# Patient Record
Sex: FEMALE | Race: WHITE | NOT HISPANIC OR LATINO
[De-identification: names, ages, dates, MRNs, and addresses within clinical notes are randomized per-mention and may not be internally consistent; named-entity substitution may affect disease eponyms.]

---

## 2017-10-05 ENCOUNTER — APPOINTMENT (OUTPATIENT)
Dept: PULMONOLOGY | Facility: CLINIC | Age: 57
End: 2017-10-05
Payer: COMMERCIAL

## 2017-10-05 VITALS — WEIGHT: 164 LBS | HEIGHT: 67 IN | RESPIRATION RATE: 12 BRPM | BODY MASS INDEX: 25.74 KG/M2

## 2017-10-05 VITALS — SYSTOLIC BLOOD PRESSURE: 124 MMHG | OXYGEN SATURATION: 99 % | DIASTOLIC BLOOD PRESSURE: 80 MMHG | HEART RATE: 75 BPM

## 2017-10-05 PROCEDURE — 99214 OFFICE O/P EST MOD 30 MIN: CPT | Mod: 25

## 2017-10-05 PROCEDURE — 94010 BREATHING CAPACITY TEST: CPT

## 2017-10-05 RX ORDER — NEOMYCIN SULFATE, POLYMYXIN B SULFATE AND BACITRACIN ZINC 3.5; 10000; 4 MG/G; [USP'U]/G; [USP'U]/G
5-400-10000 OINTMENT OPHTHALMIC
Qty: 35 | Refills: 0 | Status: DISCONTINUED | COMMUNITY
Start: 2017-06-12

## 2018-06-26 ENCOUNTER — APPOINTMENT (OUTPATIENT)
Dept: NEUROSURGERY | Facility: CLINIC | Age: 58
End: 2018-06-26
Payer: COMMERCIAL

## 2018-06-26 VITALS — BODY MASS INDEX: 24.25 KG/M2 | WEIGHT: 160 LBS | HEIGHT: 68 IN

## 2018-06-26 DIAGNOSIS — M54.2 CERVICALGIA: ICD-10-CM

## 2018-06-26 DIAGNOSIS — M54.9 DORSALGIA, UNSPECIFIED: ICD-10-CM

## 2018-06-26 PROCEDURE — 99204 OFFICE O/P NEW MOD 45 MIN: CPT

## 2018-08-03 ENCOUNTER — APPOINTMENT (OUTPATIENT)
Dept: PULMONOLOGY | Facility: CLINIC | Age: 58
End: 2018-08-03
Payer: COMMERCIAL

## 2018-08-03 VITALS
BODY MASS INDEX: 24.55 KG/M2 | HEART RATE: 74 BPM | OXYGEN SATURATION: 97 % | DIASTOLIC BLOOD PRESSURE: 80 MMHG | SYSTOLIC BLOOD PRESSURE: 130 MMHG | HEIGHT: 68 IN | WEIGHT: 162 LBS

## 2018-08-03 DIAGNOSIS — Z85.118 PERSONAL HISTORY OF OTHER MALIGNANT NEOPLASM OF BRONCHUS AND LUNG: ICD-10-CM

## 2018-08-03 PROCEDURE — 94729 DIFFUSING CAPACITY: CPT

## 2018-08-03 PROCEDURE — 85018 HEMOGLOBIN: CPT | Mod: QW

## 2018-08-03 PROCEDURE — 99214 OFFICE O/P EST MOD 30 MIN: CPT | Mod: 25

## 2018-08-03 PROCEDURE — 94010 BREATHING CAPACITY TEST: CPT

## 2018-08-03 PROCEDURE — 94727 GAS DIL/WSHOT DETER LNG VOL: CPT

## 2018-08-03 RX ORDER — OMEPRAZOLE 20 MG/1
20 TABLET, DELAYED RELEASE ORAL
Refills: 0 | Status: ACTIVE | COMMUNITY

## 2018-10-03 DIAGNOSIS — R20.2 PARESTHESIA OF SKIN: ICD-10-CM

## 2018-10-09 PROBLEM — R20.2 PARESTHESIA OF RIGHT ARM: Status: ACTIVE | Noted: 2018-10-09

## 2018-10-24 ENCOUNTER — RESULT REVIEW (OUTPATIENT)
Age: 58
End: 2018-10-24

## 2018-11-13 ENCOUNTER — RESULT CHARGE (OUTPATIENT)
Age: 58
End: 2018-11-13

## 2018-11-13 ENCOUNTER — RESULT REVIEW (OUTPATIENT)
Age: 58
End: 2018-11-13

## 2020-04-07 ENCOUNTER — APPOINTMENT (OUTPATIENT)
Dept: PULMONOLOGY | Facility: CLINIC | Age: 60
End: 2020-04-07

## 2020-07-24 ENCOUNTER — APPOINTMENT (OUTPATIENT)
Dept: PULMONOLOGY | Facility: CLINIC | Age: 60
End: 2020-07-24
Payer: COMMERCIAL

## 2020-07-24 VITALS
HEIGHT: 68 IN | WEIGHT: 164 LBS | SYSTOLIC BLOOD PRESSURE: 144 MMHG | OXYGEN SATURATION: 98 % | HEART RATE: 72 BPM | RESPIRATION RATE: 14 BRPM | BODY MASS INDEX: 24.86 KG/M2 | DIASTOLIC BLOOD PRESSURE: 80 MMHG

## 2020-07-24 DIAGNOSIS — J44.9 CHRONIC OBSTRUCTIVE PULMONARY DISEASE, UNSPECIFIED: ICD-10-CM

## 2020-07-24 DIAGNOSIS — R93.89 ABNORMAL FINDINGS ON DIAGNOSTIC IMAGING OF OTHER SPECIFIED BODY STRUCTURES: ICD-10-CM

## 2020-07-24 DIAGNOSIS — R06.02 SHORTNESS OF BREATH: ICD-10-CM

## 2020-07-24 DIAGNOSIS — R91.8 OTHER NONSPECIFIC ABNORMAL FINDING OF LUNG FIELD: ICD-10-CM

## 2020-07-24 PROCEDURE — 99214 OFFICE O/P EST MOD 30 MIN: CPT

## 2020-07-24 NOTE — PHYSICAL EXAM
[General Appearance - Well Developed] : well developed [General Appearance - In No Acute Distress] : no acute distress [Normal Appearance] : normal appearance [Normal Conjunctiva] : the conjunctiva exhibited no abnormalities [Normal Oropharynx] : normal oropharynx [Neck Appearance] : the appearance of the neck was normal [Heart Rate And Rhythm] : heart rate and rhythm were normal [Heart Sounds] : normal S1 and S2 [] : no respiratory distress [Edema] : no peripheral edema present [Murmurs] : no murmurs present [Auscultation Breath Sounds / Voice Sounds] : lungs were clear to auscultation bilaterally [Exaggerated Use Of Accessory Muscles For Inspiration] : no accessory muscle use [Respiration, Rhythm And Depth] : normal respiratory rhythm and effort [Abdomen Tenderness] : non-tender [Abdomen Soft] : soft [Abnormal Walk] : normal gait [Nail Clubbing] : no clubbing of the fingernails [Cyanosis, Localized] : no localized cyanosis [Oriented To Time, Place, And Person] : oriented to person, place, and time [FreeTextEntry1] : No abnormalities

## 2020-07-24 NOTE — CONSULT LETTER
[Dear  ___] : Dear  [unfilled], [Please see my note below.] : Please see my note below. [Consult Letter:] : I had the pleasure of evaluating your patient, [unfilled]. [Consult Closing:] : Thank you very much for allowing me to participate in the care of this patient.  If you have any questions, please do not hesitate to contact me. [DrNida  ___] : Dr. PRUETT [Neville Cheung MD] : Neville Cheung MD [Sincerely,] : Sincerely, [FreeTextEntry3] : Neville Cheung MD, FCCP, D. ABSM\par Pulmonary and Sleep Medicine\par [facility] [place]

## 2020-07-24 NOTE — HISTORY OF PRESENT ILLNESS
[On ___] : performed on [unfilled] [Patient] : the patient [Indication ___] : for an indication of [unfilled] [None] : no new symptoms reported [FreeTextEntry1] : The patient was seen 3/2015 after she went for a chest CT due to SOB and chest discomfort after shoveling snow. The CT revealed b/l pulm nodules and she subsequently had a + PET CT which revealed b/l increased uptake. She then underwent b/l UL lobectomy over a 3 month period for NSCLC in March and June 2015.. She has been followed by oncology since that time and has been doing well. She currently denies any respiratory complaints.She is an ex-30 pk-yr smoker but quit 1/2015. She is at relative baseline. Pt completed chemoRx in 10/2015.\par \par Patient c/o SOBOE but is otherwise without associated respiratory complaints. [FreeTextEntry9] : Chest CT [FreeTextEntry8] : Stable changes

## 2020-07-24 NOTE — DISCUSSION/SUMMARY
[FreeTextEntry1] : \par #1. Diet and exercise to improve her exercise tolerance\par #2. Obstruction on PFTs but is relatively asymptomatic therefore does not want inhaler therapy\par #3. Will follow lung function periodically\par #4. F/u in 6-9 months with PFTs\par #5. Diet and exercise for weight loss\par #6. Oncology f/u\par #7. Reviewed risks of exposure and symptoms of Covid-19 virus, including how the virus is spread.\par \par Discussed the following risk-reducing strategies:\par -Wash hands with soap and water (proper technique reviewed) \par -Use alcohol based  when hand-washing is not an option\par -Maintain a social distance of at least 6 feet whenever possible\par -Avoid contact, especially hand shaking\par -Avoid touching eyes, nose, and mouth\par -Cover face/mouth when coughing or sneezing\par -Avoid close contact with sick people\par -Seek medical help if you develop a fever and/or respiratory symptoms (e.g. cough, chest pain, SOB)\par \par Patient's questions were answered and patient appears to understand these recommendations

## 2020-07-24 NOTE — REASON FOR VISIT
[Follow-Up] : a follow-up visit [Abnormal CT Scan] : abnormal CT Scan [Lung Cancer] : lung cancer [FreeTextEntry2] : SOBOE

## 2020-07-24 NOTE — REVIEW OF SYSTEMS
[Anxiety] : anxiety [Fever] : no fever [Chills] : no chills [Dry Eyes] : no dryness of the eyes [Nasal Congestion] : no nasal congestion [Eye Irritation] : no ~T irritation of the eyes [Sinus Problems] : no sinus problems [Postnasal Drip] : no postnasal drip [Epistaxis] : no nosebleeds [Sputum] : not coughing up ~M sputum [Cough] : no cough [Chest Tightness] : no chest tightness [Dyspnea] : no dyspnea [Pleuritic Pain] : no pleuritic pain [Hypertension] : no ~T hypertension [Wheezing] : no wheezing [Chest Discomfort] : no chest discomfort [Dysrhythmia] : no dysrhythmia [Edema] : ~T edema was not present [Murmurs] : no murmurs were heard [Palpitations] : no palpitations [Reflux] : no reflux [Itchy Eyes] : no itching of ~T the eyes [Hay Fever] : no hay fever [Constipation] : no constipation [Vomiting] : no vomiting [Nausea] : no nausea [Abdominal Pain] : no abdominal pain [Diarrhea] : no diarrhea [Fracture] : no fracture [Trauma] : no ~T physical trauma [Dysuria] : no dysuria [Headache] : no headache [Anemia] : no anemia [Syncope] : no fainting [Dizziness] : no dizziness [Paralysis] : no paralysis was seen [Numbness] : no numbness [Seizures] : no seizures [Depression] : no depression [Diabetes] : no diabetes mellitus [Thyroid Problem] : no thyroid problem [Snoring] : no snoring [Witnessed Apneas] : demonstrated no ~M apnea [Nonrestorative Sleep] : restorative sleep

## 2020-07-24 NOTE — PROCEDURE
[FreeTextEntry1] : PFTs performed previously revealed a normal FVC and FEV1 but with an obstructive pattern which was new. She did not have a significant BD response but did have resolution of the obstruction with nebs. Lung volumes were essentially normal and diffusion capacity was mildly reduced. Overall, her lung function was likely at her new baseline post-op.\par \par PFTs subsequently were unchanged

## 2021-06-16 ENCOUNTER — APPOINTMENT (OUTPATIENT)
Dept: UROGYNECOLOGY | Facility: CLINIC | Age: 61
End: 2021-06-16

## 2023-03-31 ENCOUNTER — OFFICE (OUTPATIENT)
Dept: URBAN - METROPOLITAN AREA CLINIC 113 | Facility: CLINIC | Age: 63
Setting detail: OPHTHALMOLOGY
End: 2023-03-31
Payer: COMMERCIAL

## 2023-03-31 DIAGNOSIS — H25.13: ICD-10-CM

## 2023-03-31 DIAGNOSIS — H40.013: ICD-10-CM

## 2023-03-31 DIAGNOSIS — H43.393: ICD-10-CM

## 2023-03-31 DIAGNOSIS — H04.123: ICD-10-CM

## 2023-03-31 DIAGNOSIS — H43.392: ICD-10-CM

## 2023-03-31 DIAGNOSIS — H43.391: ICD-10-CM

## 2023-03-31 PROCEDURE — 92014 COMPRE OPH EXAM EST PT 1/>: CPT | Performed by: OPHTHALMOLOGY

## 2023-03-31 PROCEDURE — 92250 FUNDUS PHOTOGRAPHY W/I&R: CPT | Performed by: OPHTHALMOLOGY

## 2023-03-31 ASSESSMENT — REFRACTION_CURRENTRX
OD_OVR_VA: 20/
OS_AXIS: 146
OS_VPRISM_DIRECTION: PROGS
OD_VPRISM_DIRECTION: PROGS
OD_SPHERE: +2.50
OD_AXIS: 144
OS_OVR_VA: 20/
OD_CYLINDER: -0.25
OD_ADD: +1.75
OS_ADD: +1.75
OS_SPHERE: +3.50
OS_CYLINDER: -0.50

## 2023-03-31 ASSESSMENT — VISUAL ACUITY
OD_BCVA: 20/25
OS_BCVA: 20/25

## 2023-03-31 ASSESSMENT — REFRACTION_MANIFEST
OS_ADD: +2.50
OD_CYLINDER: -0.50
OS_SPHERE: +0.25
OD_AXIS: 150
OS_CYLINDER: -0.50
OD_ADD: +2.50
OS_VA1: 20/30+
OS_AXIS: 045
OD_VA1: 20/30+
OD_SPHERE: +0.25

## 2023-03-31 ASSESSMENT — REFRACTION_AUTOREFRACTION
OD_CYLINDER: -0.75
OS_AXIS: 066
OD_SPHERE: -0.50
OD_AXIS: 125
OS_SPHERE: -1.25
OS_CYLINDER: -0.50

## 2023-03-31 ASSESSMENT — SPHEQUIV_DERIVED
OD_SPHEQUIV: 0
OS_SPHEQUIV: -1.5
OD_SPHEQUIV: -0.875
OS_SPHEQUIV: 0

## 2023-03-31 ASSESSMENT — CONFRONTATIONAL VISUAL FIELD TEST (CVF)
OS_FINDINGS: FULL
OD_FINDINGS: FULL

## 2023-03-31 ASSESSMENT — TONOMETRY
OS_IOP_MMHG: 11
OD_IOP_MMHG: 12

## 2023-04-28 ENCOUNTER — OFFICE (OUTPATIENT)
Dept: URBAN - METROPOLITAN AREA CLINIC 113 | Facility: CLINIC | Age: 63
Setting detail: OPHTHALMOLOGY
End: 2023-04-28
Payer: COMMERCIAL

## 2023-04-28 DIAGNOSIS — H43.392: ICD-10-CM

## 2023-04-28 DIAGNOSIS — H43.391: ICD-10-CM

## 2023-04-28 DIAGNOSIS — H04.123: ICD-10-CM

## 2023-04-28 DIAGNOSIS — H43.393: ICD-10-CM

## 2023-04-28 DIAGNOSIS — H40.013: ICD-10-CM

## 2023-04-28 DIAGNOSIS — H25.13: ICD-10-CM

## 2023-04-28 PROCEDURE — 92083 EXTENDED VISUAL FIELD XM: CPT | Performed by: OPHTHALMOLOGY

## 2023-04-28 PROCEDURE — 92134 CPTRZ OPH DX IMG PST SGM RTA: CPT | Performed by: OPHTHALMOLOGY

## 2023-04-28 PROCEDURE — 99213 OFFICE O/P EST LOW 20 MIN: CPT | Performed by: OPHTHALMOLOGY

## 2023-04-28 ASSESSMENT — REFRACTION_MANIFEST
OD_SPHERE: +0.25
OD_AXIS: 150
OS_ADD: +2.50
OD_CYLINDER: -0.50
OS_CYLINDER: -0.50
OD_VA1: 20/30+
OS_VA1: 20/30+
OD_ADD: +2.50
OS_AXIS: 045
OS_SPHERE: +0.25

## 2023-04-28 ASSESSMENT — REFRACTION_CURRENTRX
OD_OVR_VA: 20/
OS_VPRISM_DIRECTION: PROGS
OD_CYLINDER: -0.25
OD_AXIS: 144
OD_ADD: +1.75
OS_SPHERE: +3.50
OS_ADD: +1.75
OS_CYLINDER: -0.50
OD_SPHERE: +2.50
OS_AXIS: 146
OD_VPRISM_DIRECTION: PROGS
OS_OVR_VA: 20/

## 2023-04-28 ASSESSMENT — REFRACTION_AUTOREFRACTION
OD_AXIS: 135
OS_AXIS: 061
OD_CYLINDER: -1.00
OS_SPHERE: -1.25
OD_SPHERE: -0.50
OS_CYLINDER: -0.50

## 2023-04-28 ASSESSMENT — CONFRONTATIONAL VISUAL FIELD TEST (CVF)
OS_FINDINGS: FULL
OD_FINDINGS: FULL

## 2023-04-28 ASSESSMENT — KERATOMETRY
OD_K2POWER_DIOPTERS: 41.75
OS_K2POWER_DIOPTERS: 41.75
OD_K1POWER_DIOPTERS: 41.00
OD_AXISANGLE_DEGREES: 087
OS_AXISANGLE_DEGREES: 086
OS_K1POWER_DIOPTERS: 40.75

## 2023-04-28 ASSESSMENT — TONOMETRY
OD_IOP_MMHG: 13
OS_IOP_MMHG: 12

## 2023-04-28 ASSESSMENT — SPHEQUIV_DERIVED
OS_SPHEQUIV: -1.5
OS_SPHEQUIV: 0
OD_SPHEQUIV: 0
OD_SPHEQUIV: -1

## 2023-04-28 ASSESSMENT — VISUAL ACUITY
OS_BCVA: 20/25
OD_BCVA: 20/25

## 2023-04-28 ASSESSMENT — AXIALLENGTH_DERIVED
OS_AL: 24.4484
OS_AL: 25.0925
OD_AL: 24.3991
OD_AL: 24.8231

## 2023-05-26 ENCOUNTER — OFFICE (OUTPATIENT)
Dept: URBAN - METROPOLITAN AREA CLINIC 115 | Facility: CLINIC | Age: 63
Setting detail: OPHTHALMOLOGY
End: 2023-05-26
Payer: COMMERCIAL

## 2023-05-26 DIAGNOSIS — H25.12: ICD-10-CM

## 2023-05-26 DIAGNOSIS — H04.123: ICD-10-CM

## 2023-05-26 DIAGNOSIS — H25.13: ICD-10-CM

## 2023-05-26 DIAGNOSIS — H43.393: ICD-10-CM

## 2023-05-26 DIAGNOSIS — H40.013: ICD-10-CM

## 2023-05-26 PROBLEM — H25.11 CATARACT SENILE NUCLEAR SCLEROSIS; RIGHT EYE, LEFT EYE, BOTH EYES: Status: ACTIVE | Noted: 2023-05-26

## 2023-05-26 PROCEDURE — 92136 OPHTHALMIC BIOMETRY: CPT | Performed by: OPHTHALMOLOGY

## 2023-05-26 PROCEDURE — 99214 OFFICE O/P EST MOD 30 MIN: CPT | Performed by: OPHTHALMOLOGY

## 2023-05-26 ASSESSMENT — KERATOMETRY
OS_K1POWER_DIOPTERS: 40.75
OS_K1POWER_DIOPTERS: 40.75
OS_CYLAXISANGLE_DEGREES: 086
OD_K1POWER_DIOPTERS: 41.00
OS_AXISANGLE_DEGREES: 086
OD_K2POWER_DIOPTERS: 41.75
OD_AXISANGLE_DEGREES: 177
OS_AXISANGLE_DEGREES: 176
OD_CYLPOWER_DEGREES: 0.75
OD_AXISANGLE2_DEGREES: 087
OS_K2POWER_DIOPTERS: 41.75
OS_AXISANGLE2_DEGREES: 086
OD_CYLAXISANGLE_DEGREES: 087
OS_K1K2_AVERAGE: 41.25
OD_AXISANGLE_DEGREES: 087
OS_CYLPOWER_DEGREES: 1
OD_K2POWER_DIOPTERS: 41.75
OS_K2POWER_DIOPTERS: 41.75
OD_K1K2_AVERAGE: 41.375
OD_K1POWER_DIOPTERS: 41.00

## 2023-05-26 ASSESSMENT — REFRACTION_CURRENTRX
OS_OVR_VA: 20/
OD_AXIS: 144
OD_OVR_VA: 20/
OD_CYLINDER: -0.25
OD_VPRISM_DIRECTION: PROGS
OS_CYLINDER: -0.50
OS_VPRISM_DIRECTION: PROGS
OS_ADD: +1.75
OS_SPHERE: +3.50
OD_ADD: +1.75
OS_AXIS: 146
OD_SPHERE: +2.50

## 2023-05-26 ASSESSMENT — REFRACTION_MANIFEST
OD_CYLINDER: -0.50
OD_SPHERE: +0.25
OD_AXIS: 150
OS_AXIS: 045
OS_VA1: 20/30+
OD_VA1: 20/30+
OS_ADD: +2.50
OS_SPHERE: +0.25
OS_CYLINDER: -0.50
OD_ADD: +2.50

## 2023-05-26 ASSESSMENT — AXIALLENGTH_DERIVED
OD_AL: 24.8231
OS_AL: 25.0925
OS_AL: 24.4484
OD_AL: 24.3991

## 2023-05-26 ASSESSMENT — CONFRONTATIONAL VISUAL FIELD TEST (CVF)
OD_FINDINGS: FULL
OS_FINDINGS: FULL

## 2023-05-26 ASSESSMENT — SPHEQUIV_DERIVED
OS_SPHEQUIV: 0
OS_SPHEQUIV: -1.5
OD_SPHEQUIV: -1
OD_SPHEQUIV: 0

## 2023-05-26 ASSESSMENT — REFRACTION_AUTOREFRACTION
OS_CYLINDER: -0.50
OS_SPHERE: -1.25
OD_SPHERE: -0.50
OS_AXIS: 061
OD_AXIS: 135
OD_CYLINDER: -1.00

## 2023-05-26 ASSESSMENT — TONOMETRY
OS_IOP_MMHG: 13
OD_IOP_MMHG: 18

## 2023-05-26 ASSESSMENT — VISUAL ACUITY
OD_BCVA: 20/30
OS_BCVA: 20/30